# Patient Record
(demographics unavailable — no encounter records)

---

## 2024-11-05 NOTE — CONSULT LETTER
[Dear  ___] : Dear  [unfilled], [Consult Letter:] : I had the pleasure of evaluating your patient, [unfilled]. [( Thank you for referring [unfilled] for consultation for _____ )] : Thank you for referring [unfilled] for consultation for [unfilled] [Please see my note below.] : Please see my note below. [Consult Closing:] : Thank you very much for allowing me to participate in the care of this patient.  If you have any questions, please do not hesitate to contact me. [Sincerely,] : Sincerely, [FreeTextEntry2] : Dr. Luma Bansal  [address] [address]   [FreeTextEntry3] : Toan Alexandre MD, FACOG, FACS  Minimally Invasive Gynecologic Surgery  [address] [address]  Tel: [phone]  Fax: [phone]

## 2024-11-05 NOTE — DISCUSSION/SUMMARY
[FreeTextEntry1] : I sat down with the patient to discuss the imaging findings & her symptoms which warrant surgical intervention.  We looked at the MRI together.  I explained that this is an elective procedure and she may not want to have myoma removed because it delays fertility, she will have to wait 4-6 months after the surgery before she can attempt pregnancy.  There is no evidence to show that presence of uterine myoma decrease fertility unless they are submucosal or blocking the opening of fallopian tubes. She may have pain and  contractions during pregnancy from a myoma with possible degeneration. Discussion about management options uterine myoma including uterine artery embolization and radiofreqency destruction of myoma (both not recommended if planning pregnancy) and myomectomy.  In general, uterine sarcoma is rare 1/500 and difficult to diagnose without pathological evaluation of myoma.  Theses have been long standing myomata and malignancy is unlikely.  The options for surgical approach including open, vaginal, and laparoscopic with or without robotic assistance were discussed she would like to proceed with laparoscopic or robotic myomectomy.       The differential diagnosis was discussed in detail. The indications, risks, benefits and alternatives were discussed. Including but not limited to, conversion to laparotomy, bleeding, infection, injury to surrounding organs was discussed at length.  Rare chance of hysterectomy.  Chance of occult injury and need for future surgery, fistula formation. sri aliza The substantial increase in risks due to her body habitus including, but not limited to, heightened surgical difficulty due to compromised intraoperative exposure and increased operative time, was explicitly discussed at length.  ----------------- expressed an understanding of the treatment rationale and her questions were answered to her apparent satisfaction.  She was given written postoperative instructions and diagram of the pelvic with relevant surrounding anatomy.               PRE-OP DIAGNOSIS: desired long-term, reversible contraception        POST-OP DIAGNOSIS: Same

## 2024-11-05 NOTE — HISTORY OF PRESENT ILLNESS
[FreeTextEntry1] : 44 yo patient presents for fibroid consultation. Patient states when she has her mense she feels bloated and describes the blood flow light.